# Patient Record
Sex: MALE | Race: WHITE | NOT HISPANIC OR LATINO | Employment: FULL TIME | URBAN - METROPOLITAN AREA
[De-identification: names, ages, dates, MRNs, and addresses within clinical notes are randomized per-mention and may not be internally consistent; named-entity substitution may affect disease eponyms.]

---

## 2019-02-25 ENCOUNTER — TELEPHONE (OUTPATIENT)
Dept: INTERNAL MEDICINE CLINIC | Facility: CLINIC | Age: 34
End: 2019-02-25

## 2019-02-25 NOTE — TELEPHONE ENCOUNTER
Xin Gutiérrez has moved away    Needs his chart faxed to his new PCP  Pt is going to have the new phys office fax over for his medical records today  Pt has appt this week   Can we send the ASAP

## 2023-12-04 ENCOUNTER — OFFICE VISIT (OUTPATIENT)
Age: 38
End: 2023-12-04
Payer: COMMERCIAL

## 2023-12-04 ENCOUNTER — APPOINTMENT (OUTPATIENT)
Age: 38
End: 2023-12-04
Payer: COMMERCIAL

## 2023-12-04 VITALS
HEIGHT: 67 IN | DIASTOLIC BLOOD PRESSURE: 46 MMHG | SYSTOLIC BLOOD PRESSURE: 136 MMHG | BODY MASS INDEX: 32.65 KG/M2 | WEIGHT: 208 LBS

## 2023-12-04 DIAGNOSIS — E10.9 TYPE 1 DIABETES MELLITUS WITHOUT COMPLICATION (HCC): ICD-10-CM

## 2023-12-04 DIAGNOSIS — E10.9 TYPE 1 DIABETES MELLITUS WITHOUT COMPLICATION (HCC): Primary | ICD-10-CM

## 2023-12-04 PROCEDURE — 83036 HEMOGLOBIN GLYCOSYLATED A1C: CPT

## 2023-12-04 PROCEDURE — 36415 COLL VENOUS BLD VENIPUNCTURE: CPT

## 2023-12-04 PROCEDURE — 99204 OFFICE O/P NEW MOD 45 MIN: CPT | Performed by: INTERNAL MEDICINE

## 2023-12-04 RX ORDER — INSULIN PUMP CARTRIDGE
CARTRIDGE (EA) SUBCUTANEOUS
COMMUNITY
Start: 2023-11-04

## 2023-12-04 RX ORDER — PROCHLORPERAZINE 25 MG/1
SUPPOSITORY RECTAL
COMMUNITY
Start: 2023-11-06

## 2023-12-04 RX ORDER — INSULIN LISPRO 100 [IU]/ML
50 INJECTION, SOLUTION INTRAVENOUS; SUBCUTANEOUS DAILY
COMMUNITY

## 2023-12-04 RX ORDER — KETOCONAZOLE 20 MG/G
CREAM TOPICAL
COMMUNITY

## 2023-12-04 RX ORDER — INFUSION SET FOR INSULIN PUMP
INFUSION SETS-PARAPHERNALIA MISCELLANEOUS
COMMUNITY
Start: 2023-11-20

## 2023-12-04 RX ORDER — PROCHLORPERAZINE 25 MG/1
SUPPOSITORY RECTAL
COMMUNITY
Start: 2023-11-04

## 2023-12-04 NOTE — PROGRESS NOTES
Fiordaliza Banks 44 y.o. male MRN: [id number]    Encounter: [id number]      Assessment/Plan     Assessment: This is a 44y.o.-year-old male with h/o T1DM since age 2 is referred for DM management  1-  DEXCOM date    BS average=160  in target 60% , 3+3 % low and very low and rest high  . Usually lowsare around 6 pm preceded by highs  12 am =0.5, carb/I=9/1, ISF=50/1  3 am=0.7       7/1 50/1  12:20 pm=0.25       10/1       50/1  6 pm=0.5                   9/1 50/1  Changes reservoir every 3 days  Updated on eye exam  Last A1c=6.4%(08/23)  RFL=160  No proteinuria    Plan:  New basal rate time at 3 pm with new basal rate of 0.35, drop 12 pm basal rate time. Then new time at 10 pm  with basal rate of 0.5  RTV in 4 m with A1c, TSH  Proceed with A1c today also    CC: Diabetes    History of Present Illness     HPI:  See assessment     Review of Systems   Constitutional:  Negative for appetite change, fatigue and unexpected weight change. HENT:  Negative for trouble swallowing. Eyes:  Negative for visual disturbance. Respiratory:  Negative for cough, chest tightness and shortness of breath. Cardiovascular:  Negative for chest pain, palpitations and leg swelling. Gastrointestinal:  Negative for blood in stool, constipation, diarrhea, nausea and vomiting. Endocrine: Negative for polyuria. Genitourinary:  Negative for dysuria, frequency and genital sores. Skin:  Negative for rash and wound. Neurological:  Negative for numbness and headaches. Hematological:  Does not bruise/bleed easily. Psychiatric/Behavioral:  Negative for confusion and sleep disturbance. Historical Information   No past medical history on file. No past surgical history on file.   Social History   Social History     Substance and Sexual Activity   Alcohol Use Not on file     Social History     Substance and Sexual Activity   Drug Use Not on file     Social History     Tobacco Use   Smoking Status Not on file Smokeless Tobacco Not on file     Family History: No family history on file. Meds/Allergies   Current Outpatient Medications   Medication Sig Dispense Refill    Continuous Blood Gluc Sensor (Dexcom G6 Sensor) MISC       Continuous Blood Gluc Transmit (Dexcom G6 Transmitter) MISC       HumaLOG 100 UNIT/ML injection 50 Units daily      Insulin Infusion Pump Supplies (AutoSoft 90 Infusion Set) MISC       Insulin Infusion Pump Supplies (T:slim X2 3mL Cartridge) MISC       ketoconazole (NIZORAL) 2 % cream        No current facility-administered medications for this visit. No Known Allergies    Objective   Vitals: Blood pressure (!) 136/46, height 5' 7" (1.702 m), weight 94.3 kg (208 lb). Physical Exam  Vitals reviewed. Constitutional:       Appearance: He is obese. HENT:      Head: Normocephalic. Eyes:      Extraocular Movements: Extraocular movements intact. Neck:      Vascular: No carotid bruit. Cardiovascular:      Rate and Rhythm: Normal rate and regular rhythm. Pulses: Normal pulses. Heart sounds: Normal heart sounds. No murmur heard. No gallop. Pulmonary:      Effort: Pulmonary effort is normal.      Breath sounds: Normal breath sounds. No wheezing or rales. Abdominal:      General: Bowel sounds are normal.      Palpations: Abdomen is soft. Tenderness: There is no abdominal tenderness. Musculoskeletal:      Cervical back: No tenderness. Right lower leg: No edema. Left lower leg: No edema. Lymphadenopathy:      Cervical: No cervical adenopathy. Skin:     General: Skin is warm. Coloration: Skin is not jaundiced. Findings: No rash. Neurological:      General: No focal deficit present. Mental Status: He is alert and oriented to person, place, and time. Cranial Nerves: No cranial nerve deficit.    Psychiatric:         Mood and Affect: Mood normal.         Behavior: Behavior normal.       The history was obtained from the review of the chart, patient. Lab Results:            Imaging Studies: I have personally reviewed pertinent reports. Portions of the record may have been created with voice recognition software. Occasional wrong word or "sound a like" substitutions may have occurred due to the inherent limitations of voice recognition software. Read the chart carefully and recognize, using context, where substitutions have occurred.

## 2023-12-05 LAB
EST. AVERAGE GLUCOSE BLD GHB EST-MCNC: 154 MG/DL
HBA1C MFR BLD: 7 %

## 2024-01-18 DIAGNOSIS — E10.9 TYPE 1 DIABETES MELLITUS WITHOUT COMPLICATION (HCC): Primary | ICD-10-CM

## 2024-01-18 NOTE — TELEPHONE ENCOUNTER
Requested medication(s) are due for refill today: Yes  Patient has already received a courtesy refill: No  Other reason request has been forwarded to provider: Failed protocol

## 2024-01-19 RX ORDER — INSULIN LISPRO 100 [IU]/ML
INJECTION, SOLUTION INTRAVENOUS; SUBCUTANEOUS
Qty: 50 ML | Refills: 1 | Status: SHIPPED | OUTPATIENT
Start: 2024-01-19

## 2024-01-31 DIAGNOSIS — E10.9 TYPE 1 DIABETES MELLITUS WITHOUT COMPLICATION (HCC): Primary | ICD-10-CM

## 2024-02-07 RX ORDER — INFUSION SET FOR INSULIN PUMP
INFUSION SETS-PARAPHERNALIA MISCELLANEOUS
Qty: 10 EACH | Refills: 2 | Status: SHIPPED | OUTPATIENT
Start: 2024-02-07

## 2024-02-07 RX ORDER — PROCHLORPERAZINE 25 MG/1
SUPPOSITORY RECTAL
Qty: 3 EACH | Refills: 2 | Status: SHIPPED | OUTPATIENT
Start: 2024-02-07

## 2024-02-07 RX ORDER — PROCHLORPERAZINE 25 MG/1
1 SUPPOSITORY RECTAL
Qty: 3 EACH | Refills: 2 | Status: SHIPPED | OUTPATIENT
Start: 2024-02-07

## 2024-02-07 RX ORDER — INSULIN PUMP CARTRIDGE
CARTRIDGE (EA) SUBCUTANEOUS
Qty: 10 EACH | Refills: 2 | Status: SHIPPED | OUTPATIENT
Start: 2024-02-07

## 2024-03-28 ENCOUNTER — TELEPHONE (OUTPATIENT)
Age: 39
End: 2024-03-28

## 2024-03-28 NOTE — TELEPHONE ENCOUNTER
Griselda from [employer] called looking for update in regards to document request that was faxed on 3/22.  ( Did not see this in the chart) Griselda also said they emailed you as well.     Griselda is asking for a call back. She states it looks like his insurance is also looking for a letter of medical necessity as well as the request.    Griselda is requesting a call back from you.    Griselda from [employer]  [phone]

## 2024-03-29 NOTE — TELEPHONE ENCOUNTER
Received message upon return to the office, called Griselda at [employer] at the provided number, however it was a general sales line and we where not able to leave a message. The forms have been printed, provider is out of the office today, however we will scan into chart and have provider sign upon return next week. Patient also comes in next week for an appt

## 2024-04-09 ENCOUNTER — LAB (OUTPATIENT)
Age: 39
End: 2024-04-09
Payer: COMMERCIAL

## 2024-04-09 ENCOUNTER — OFFICE VISIT (OUTPATIENT)
Age: 39
End: 2024-04-09
Payer: COMMERCIAL

## 2024-04-09 VITALS
SYSTOLIC BLOOD PRESSURE: 118 MMHG | BODY MASS INDEX: 33.12 KG/M2 | WEIGHT: 211 LBS | DIASTOLIC BLOOD PRESSURE: 78 MMHG | HEIGHT: 67 IN | HEART RATE: 57 BPM | OXYGEN SATURATION: 97 %

## 2024-04-09 DIAGNOSIS — E10.9 TYPE 1 DIABETES MELLITUS WITHOUT COMPLICATION (HCC): Primary | Chronic | ICD-10-CM

## 2024-04-09 DIAGNOSIS — E10.9 TYPE 1 DIABETES MELLITUS WITHOUT COMPLICATION (HCC): ICD-10-CM

## 2024-04-09 DIAGNOSIS — E10.9 TYPE 1 DIABETES MELLITUS WITHOUT COMPLICATION (HCC): Primary | ICD-10-CM

## 2024-04-09 DIAGNOSIS — E55.9 VITAMIN D DEFICIENCY: ICD-10-CM

## 2024-04-09 LAB
25(OH)D3 SERPL-MCNC: 20.6 NG/ML (ref 30–100)
ALBUMIN SERPL BCP-MCNC: 4.6 G/DL (ref 3.5–5)
ALP SERPL-CCNC: 47 U/L (ref 34–104)
ALT SERPL W P-5'-P-CCNC: 18 U/L (ref 7–52)
ANION GAP SERPL CALCULATED.3IONS-SCNC: 6 MMOL/L (ref 4–13)
AST SERPL W P-5'-P-CCNC: 21 U/L (ref 13–39)
BILIRUB SERPL-MCNC: 0.42 MG/DL (ref 0.2–1)
BUN SERPL-MCNC: 13 MG/DL (ref 5–25)
CALCIUM SERPL-MCNC: 9.1 MG/DL (ref 8.4–10.2)
CHLORIDE SERPL-SCNC: 103 MMOL/L (ref 96–108)
CHOLEST SERPL-MCNC: 166 MG/DL
CO2 SERPL-SCNC: 31 MMOL/L (ref 21–32)
CREAT SERPL-MCNC: 0.88 MG/DL (ref 0.6–1.3)
CREAT UR-MCNC: 99.6 MG/DL
GFR SERPL CREATININE-BSD FRML MDRD: 108 ML/MIN/1.73SQ M
GLUCOSE P FAST SERPL-MCNC: 188 MG/DL (ref 65–99)
HDLC SERPL-MCNC: 59 MG/DL
LDLC SERPL CALC-MCNC: 95 MG/DL (ref 0–100)
MICROALBUMIN UR-MCNC: 7.9 MG/L
MICROALBUMIN/CREAT 24H UR: 8 MG/G CREATININE (ref 0–30)
POTASSIUM SERPL-SCNC: 4.3 MMOL/L (ref 3.5–5.3)
PROT SERPL-MCNC: 6.6 G/DL (ref 6.4–8.4)
SL AMB POCT HEMOGLOBIN AIC: 6.5 (ref ?–6.5)
SODIUM SERPL-SCNC: 140 MMOL/L (ref 135–147)
T4 FREE SERPL-MCNC: 0.68 NG/DL (ref 0.61–1.12)
TRIGL SERPL-MCNC: 61 MG/DL
TSH SERPL DL<=0.05 MIU/L-ACNC: 1.29 UIU/ML (ref 0.45–4.5)

## 2024-04-09 PROCEDURE — 36415 COLL VENOUS BLD VENIPUNCTURE: CPT

## 2024-04-09 PROCEDURE — 95251 CONT GLUC MNTR ANALYSIS I&R: CPT | Performed by: STUDENT IN AN ORGANIZED HEALTH CARE EDUCATION/TRAINING PROGRAM

## 2024-04-09 PROCEDURE — 80061 LIPID PANEL: CPT

## 2024-04-09 PROCEDURE — 83036 HEMOGLOBIN GLYCOSYLATED A1C: CPT

## 2024-04-09 PROCEDURE — 84439 ASSAY OF FREE THYROXINE: CPT

## 2024-04-09 PROCEDURE — 99214 OFFICE O/P EST MOD 30 MIN: CPT | Performed by: STUDENT IN AN ORGANIZED HEALTH CARE EDUCATION/TRAINING PROGRAM

## 2024-04-09 PROCEDURE — 82306 VITAMIN D 25 HYDROXY: CPT

## 2024-04-09 PROCEDURE — 84443 ASSAY THYROID STIM HORMONE: CPT

## 2024-04-09 PROCEDURE — 82570 ASSAY OF URINE CREATININE: CPT

## 2024-04-09 PROCEDURE — 82043 UR ALBUMIN QUANTITATIVE: CPT

## 2024-04-09 PROCEDURE — 80053 COMPREHEN METABOLIC PANEL: CPT

## 2024-04-09 RX ORDER — INSULIN LISPRO 100 [IU]/ML
INJECTION, SOLUTION INTRAVENOUS; SUBCUTANEOUS
Qty: 64 ML | Refills: 1 | Status: SHIPPED | OUTPATIENT
Start: 2024-04-09

## 2024-04-09 NOTE — ASSESSMENT & PLAN NOTE
Lab Results   Component Value Date    HGBA1C 7.0 (H) 12/04/2023   Diabetes well-controlled A1c of 6.5% (was checked at his visit today.)  Although his CGM report for last 2 weeks showed worsening of glycemic control, he admits dietary indiscretion, he just moved, was not following carb consistent diet,  For now I would not change his current setting, balanced diet and regular daily exercise was encouraged,  He is upgrading his pump to [employer] Mobi,  Hypoglycemia symptoms and treatment were reviewed.  Return back in 3 months.  Complete labs as ordered.

## 2024-04-17 ENCOUNTER — TELEPHONE (OUTPATIENT)
Age: 39
End: 2024-04-17

## 2024-04-17 NOTE — TELEPHONE ENCOUNTER
Pt is asking for a letter of medical necessity, he has to buy an iphone in order to use his new pump. His  told him if he gets this letter of med necessity they will pay for that phone since he needs it for his pump.   Please call pt when/if can be done/

## 2024-04-19 ENCOUNTER — DOCUMENTATION (OUTPATIENT)
Age: 39
End: 2024-04-19

## 2024-04-22 ENCOUNTER — TELEPHONE (OUTPATIENT)
Dept: DIABETES SERVICES | Facility: CLINIC | Age: 39
End: 2024-04-22

## 2024-04-22 DIAGNOSIS — E10.9 TYPE 1 DIABETES MELLITUS WITHOUT COMPLICATION (HCC): Primary | ICD-10-CM

## 2024-05-19 ENCOUNTER — OFFICE VISIT (OUTPATIENT)
Age: 39
End: 2024-05-19
Payer: COMMERCIAL

## 2024-05-19 ENCOUNTER — APPOINTMENT (OUTPATIENT)
Age: 39
End: 2024-05-19
Payer: COMMERCIAL

## 2024-05-19 VITALS
TEMPERATURE: 96.3 F | RESPIRATION RATE: 18 BRPM | BODY MASS INDEX: 33.3 KG/M2 | DIASTOLIC BLOOD PRESSURE: 84 MMHG | HEART RATE: 59 BPM | SYSTOLIC BLOOD PRESSURE: 128 MMHG | WEIGHT: 212.6 LBS | OXYGEN SATURATION: 98 %

## 2024-05-19 DIAGNOSIS — S92.535A CLOSED NONDISPLACED FRACTURE OF DISTAL PHALANX OF LESSER TOE OF LEFT FOOT, INITIAL ENCOUNTER: Primary | ICD-10-CM

## 2024-05-19 DIAGNOSIS — M79.672 PAIN IN LEFT FOOT: ICD-10-CM

## 2024-05-19 PROCEDURE — S9083 URGENT CARE CENTER GLOBAL: HCPCS | Performed by: PHYSICIAN ASSISTANT

## 2024-05-19 PROCEDURE — 73630 X-RAY EXAM OF FOOT: CPT

## 2024-05-19 PROCEDURE — G0382 LEV 3 HOSP TYPE B ED VISIT: HCPCS | Performed by: PHYSICIAN ASSISTANT

## 2024-05-19 NOTE — PROGRESS NOTES
[facility]        NAME: Edward Brand is a 38 y.o. male  : 1985    MRN: [id number]  DATE: May 19, 2024  TIME: 10:19 AM    Assessment and Plan   Closed nondisplaced fracture of distal phalanx of lesser toe of left foot, initial encounter [S92.535A]  1. Closed nondisplaced fracture of distal phalanx of lesser toe of left foot, initial encounter  Ambulatory Referral to Podiatry    Post Op Shoe      2. Pain in left foot  CANCELED: XR foot 2 vw left          Patient has a minimally displaced intra-articular fracture of the distal phalanx of the second toe of the left foot.  The toes are jorge taped together to the third toe.  He was placed into a postop shoe.  He will be referred to podiatry.    The patient verbalized understanding of exam findings and treatment plan.   We engaged in the shared decision-making process and treatment options were   discussed at length with the patient.  All questions, concerns and  complaints were answered and addressed to the patient's satisfaction.    Patient Instructions   There are no Patient Instructions on file for this visit.    Follow up with PCP in 3-5 days.  Proceed to  ER if symptoms worsen.    If tests are performed, our office will contact you with results only if   changes need to made to the care plan discussed with you at the visit.   You can review your full results on [facility].     Chief Complaint     Chief Complaint   Patient presents with   • Toe Pain     Left second toe pain stubbing toe last night before bed.Pain 7/10 on pain scale when ambulating. Did not take anything         History of Present Illness       HPI  Patient reports yesterday he was walking and stubbed his toe on a bathtub.  He is concerned for fracture.  He was notes pain in the distal aspect of the second toe with walking better with rest.  Denies any numbness or tingling.    Review of Systems   Review of Systems  All other related systems reviewed and are negative  except as noted in HPI    Current Medications       Current Outpatient Medications:   •  Continuous Blood Gluc Sensor (Dexcom G6 Sensor) MISC, Inject 1 Device under the skin every 10 days, Disp: 3 each, Rfl: 2  •  Continuous Blood Gluc Transmit (Dexcom G6 Transmitter) MISC, Used As Directed., Disp: 3 each, Rfl: 2  •  HumaLOG 100 UNIT/ML injection, Inject up to 70 units daily via insulin pump., Disp: 64 mL, Rfl: 1  •  Insulin Infusion Pump Supplies (AutoSoft 90 Infusion Set) MISC, Use As Directed, Disp: 10 each, Rfl: 2  •  Insulin Infusion Pump Supplies (T:slim X2 3mL Cartridge) MISC, Use As Directed., Disp: 10 each, Rfl: 2  •  ketoconazole (NIZORAL) 2 % cream, , Disp: , Rfl:   •  vitamin E, tocopherol, 200 units capsule, Take 200 Units by mouth daily, Disp: , Rfl:     Current Allergies     Allergies as of 05/19/2024 - Reviewed 05/19/2024   Allergen Reaction Noted   • Cat hair extract Allergic Rhinitis 09/03/2014   • Dog epithelium Allergic Rhinitis 08/26/2011            The following portions of the patient's history were reviewed and updated as appropriate: allergies, current medications, past family history, past medical history, past social history, past surgical history and problem list.     Past Medical History:   Diagnosis Date   • Diabetes mellitus (HCC)        History reviewed. No pertinent surgical history.    History reviewed. No pertinent family history.      Medications have been verified.        Objective   /84   Pulse 59   Temp (!) 96.3 °F (35.7 °C)   Resp 18   Wt 96.4 kg (212 lb 9.6 oz)   SpO2 98%   BMI 33.30 kg/m²   No LMP for male patient.       Physical Exam     Physical Exam    Left Ankle Exam   Swelling: mild    Other   Erythema: absent  Scars: absent  Sensation: normal  Pulse: present    Comments:  Ecchymosis and tenderness over the distal phalanx dorsally of the left second toe            I have personally reviewed pertinent films in PACS and my interpretation is x-ray left foot  "demonstrates on lateral view admittedly displaced intra-articular fracture of the dorsal aspect of the distal phalanx of the second toe.    Procedures  No Procedures performed today        Note: Portions of this record may have been created with voice recognition software. Occasional wrong word or \"sound a like\" substitutions may have occurred due to the inherent limitations of voice recognition software. Please read the chart carefully and recognize, using context, where substitutions have occurred.*      "

## 2024-06-21 ENCOUNTER — TELEPHONE (OUTPATIENT)
Age: 39
End: 2024-06-21

## 2024-07-01 ENCOUNTER — OFFICE VISIT (OUTPATIENT)
Dept: ENDOCRINOLOGY | Facility: CLINIC | Age: 39
End: 2024-07-01
Payer: COMMERCIAL

## 2024-07-01 VITALS
HEART RATE: 64 BPM | BODY MASS INDEX: 32.51 KG/M2 | OXYGEN SATURATION: 98 % | TEMPERATURE: 98.7 F | WEIGHT: 207.6 LBS | SYSTOLIC BLOOD PRESSURE: 136 MMHG | DIASTOLIC BLOOD PRESSURE: 80 MMHG

## 2024-07-01 DIAGNOSIS — E10.9 TYPE 1 DIABETES MELLITUS WITHOUT COMPLICATION (HCC): Primary | ICD-10-CM

## 2024-07-01 DIAGNOSIS — E55.9 VITAMIN D DEFICIENCY: ICD-10-CM

## 2024-07-01 PROCEDURE — 95251 CONT GLUC MNTR ANALYSIS I&R: CPT | Performed by: STUDENT IN AN ORGANIZED HEALTH CARE EDUCATION/TRAINING PROGRAM

## 2024-07-01 PROCEDURE — 99214 OFFICE O/P EST MOD 30 MIN: CPT | Performed by: STUDENT IN AN ORGANIZED HEALTH CARE EDUCATION/TRAINING PROGRAM

## 2024-07-01 RX ORDER — OMEGA-3S/DHA/EPA/FISH OIL/D3 300MG-1000
400 CAPSULE ORAL DAILY
COMMUNITY

## 2024-07-01 NOTE — ASSESSMENT & PLAN NOTE
Lab Results   Component Value Date    HGBA1C 6.5 04/09/2024     Diabetes is controlled A1c of 6.5% and time in range of 78 %, however his CGM report showed frequent hypoglycemia 7.1%, mostly during the day and mostly postprandial, there are few issues, first, he inserts carb amount mostly either in the middle of eating or after his done eating, which is causing hyperglycemia and then hypoglycemia postprandially, he also over correcting his hypoglycemia.  In addition to asking him insert carb before he eats, I decreased his insulin carb ratio from 1: 7 to 1:9.  He was instructed to call our office in 2 weeks if still gets frequent hypoglycemia episode, next will be to decrease his basal ratio during the day.  Hypoglycemia symptoms were reviewed, rule of 15's to treat hypoglycemia emphasized.  Ophthalmology and podiatry follow-up.  Return back in 3 months.  Labs prior to next visit.

## 2024-07-01 NOTE — PROGRESS NOTES
Established patient Progress Note      Cc: diabetes    Referring Provider  No referring provider defined for this encounter.     History of Present Illness:   Edward Brand is a 38 y.o. male with a history of type 1 diabetes  who presented for follow up.        Denies recent illness or hospitalizations.    Patient is on a [employer] Mobi pump prescribed by Endocrinology.  He has been on this pump for 1 years.   He denies any malfunctioning of the pump.      Current Insulin pump settings:  Basal rate:  12 AM: 0.5  3 AM: 0.7  3 PM: 0.35  6 PM: 0.6  10 PM: 0.5  Insulin to carb ratio:  12 AM: 9  3 AM: 7  6 PM: 9  Insulin sensitivity factor: 50  BG target: 110 mg/dL  Type of insulin: Humalog  Active Insulin Time: 4      Discussed with patient in case of malfunctioning of the pump to use basal and bolus therapy as backup which is prescribed to the patient. Also notified patient to call clinic if any issues.       Edward Brand   Device used,dexcom G6  Home use       Indication   Type 1 Diabetes      More than 72 hours of data was reviewed. Report to be scanned to chart.     Date Range: June 18 - July 1st    Analysis of data:   Average Glucose: 130 mg/dl  Coefficient of Variation: 38%   SD : 49 mg/dl   Time in Target Range: 78%   Time Above Range: 14.4%   Time Below Range: 7.1%       Hypoglycemic episodes:   H/o of hypoglycemia causing hospitalization or Intervention such as glucagon injection  or ambulance call : no  Has awareness: yes       Opthamology:  overdue, will schedule   Podiatry: no    on ACE inhibitor or ARB: no  on statin: no    Thyroid disorders: no      Patient Active Problem List   Diagnosis    Type 1 diabetes mellitus without complication (HCC)    Vitamin D deficiency    Closed nondisplaced fracture of distal phalanx of lesser toe of left foot, initial encounter      Past Medical History:    Diagnosis Date    Diabetes mellitus (HCC)       History reviewed. No pertinent surgical history.   History reviewed. No pertinent family history.  Social History     Tobacco Use    Smoking status: Former     Current packs/day: 1.00     Average packs/day: 1 pack/day for 22.5 years (22.5 ttl pk-yrs)     Types: Cigarettes     Start date: 2002    Smokeless tobacco: Never   Substance Use Topics    Alcohol use: Never     Allergies   Allergen Reactions    Cat Hair Extract Allergic Rhinitis    Dog Epithelium Allergic Rhinitis         Current Outpatient Medications:     cholecalciferol (VITAMIN D3) 400 units tablet, Take 400 Units by mouth daily, Disp: , Rfl:     Continuous Blood Gluc Sensor (Dexcom G6 Sensor) MISC, Inject 1 Device under the skin every 10 days, Disp: 3 each, Rfl: 2    Continuous Blood Gluc Transmit (Dexcom G6 Transmitter) MISC, Used As Directed., Disp: 3 each, Rfl: 2    HumaLOG 100 UNIT/ML injection, Inject up to 70 units daily via insulin pump., Disp: 64 mL, Rfl: 1    Insulin Infusion Pump Supplies (AutoSoft 90 Infusion Set) MISC, Use As Directed, Disp: 10 each, Rfl: 2    Insulin Infusion Pump Supplies (T:slim X2 3mL Cartridge) MISC, Use As Directed., Disp: 10 each, Rfl: 2    ketoconazole (NIZORAL) 2 % cream, , Disp: , Rfl:     vitamin E, tocopherol, 200 units capsule, Take 200 Units by mouth daily (Patient not taking: Reported on 7/1/2024), Disp: , Rfl:   Review of Systems   Constitutional:  Negative for appetite change, fatigue and unexpected weight change.   HENT:  Negative for trouble swallowing.    Eyes:  Negative for visual disturbance.   Cardiovascular:  Negative for chest pain and palpitations.   Gastrointestinal:  Negative for abdominal pain, constipation, diarrhea, nausea and vomiting.   Endocrine: Negative for polydipsia, polyphagia and polyuria.       Physical Exam:  Body mass index is 32.51 kg/m².  /80 (BP Location: Right arm, Patient Position: Sitting, Cuff Size: Adult)   Pulse 64    "Temp 98.7 °F (37.1 °C)   Wt 94.2 kg (207 lb 9.6 oz)   SpO2 98%   BMI 32.51 kg/m²    Wt Readings from Last 3 Encounters:   07/01/24 94.2 kg (207 lb 9.6 oz)   05/19/24 96.4 kg (212 lb 9.6 oz)   04/09/24 95.7 kg (211 lb)       GEN: NAD  E/n/m nl facies,   Eyes: no stare or proptosis,   CV; heart reg rate s1s2 nl, no Murmur   Neuro: no tremor,   Skin: warm and dry,   Ext:  no edema bilaterally,   Psych: nl mood and affect, no gross lapses in memory      Labs:   No components found for: \"HA1C\"  No components found for: \"GLU\"    Lab Results   Component Value Date    CREATININE 0.88 04/09/2024    BUN 13 04/09/2024    K 4.3 04/09/2024     04/09/2024    CO2 31 04/09/2024     eGFR   Date Value Ref Range Status   04/09/2024 108 ml/min/1.73sq m Final     No components found for: \"MALBCRER\"    Lab Results   Component Value Date    HDL 59 04/09/2024    TRIG 61 04/09/2024       Lab Results   Component Value Date    ALT 18 04/09/2024    AST 21 04/09/2024    ALKPHOS 47 04/09/2024       Lab Results   Component Value Date    FREET4 0.68 04/09/2024       Impression:  1. Type 1 diabetes mellitus without complication (HCC)    2. Vitamin D deficiency           Plan:    Problem List Items Addressed This Visit          Endocrine    Type 1 diabetes mellitus without complication (HCC) - Primary       Lab Results   Component Value Date    HGBA1C 6.5 04/09/2024     Diabetes is controlled A1c of 6.5% and time in range of 78 %, however his CGM report showed frequent hypoglycemia 7.1%, mostly during the day and mostly postprandial, there are few issues, first, he inserts carb amount mostly either in the middle of eating or after his done eating, which is causing hyperglycemia and then hypoglycemia postprandially, he also over correcting his hypoglycemia.  In addition to asking him insert carb before he eats, I decreased his insulin carb ratio from 1: 7 to 1:9.  He was instructed to call our office in 2 weeks if still gets frequent " hypoglycemia episode, next will be to decrease his basal ratio during the day.  Hypoglycemia symptoms were reviewed, rule of 15's to treat hypoglycemia emphasized.  Ophthalmology and podiatry follow-up.  Return back in 3 months.  Labs prior to next visit.           Relevant Orders    Hemoglobin A1C    Comprehensive metabolic panel    Lipid Panel with Direct LDL reflex    Albumin / creatinine urine ratio    Cortisol Level, AM Specimen       Other    Vitamin D deficiency     Continue vitamin D supplementation.                  Discussed with the patient and all questioned fully answered. He will call me if any problems arise.    Counseled patient on diagnostic results, prognosis, risk and benefit of treatment options, instruction for management, importance of treatment compliance, Risk  factor reduction and impressions      Brice Kang MD

## 2024-08-09 DIAGNOSIS — E10.9 TYPE 1 DIABETES MELLITUS WITHOUT COMPLICATION (HCC): ICD-10-CM

## 2024-08-09 RX ORDER — ACYCLOVIR 400 MG/1
1 TABLET ORAL
COMMUNITY
End: 2024-08-09 | Stop reason: SDUPTHER

## 2024-08-09 RX ORDER — ACYCLOVIR 400 MG/1
TABLET ORAL
COMMUNITY
End: 2024-08-09 | Stop reason: SDUPTHER

## 2024-08-09 RX ORDER — PROCHLORPERAZINE 25 MG/1
1 SUPPOSITORY RECTAL
Qty: 3 EACH | Refills: 2 | Status: SHIPPED | OUTPATIENT
Start: 2024-08-09

## 2024-08-09 NOTE — TELEPHONE ENCOUNTER
Reason for call:   [x] Refill   [] Prior Auth  [] Other:     Office:   [] PCP/Provider -   [x] Specialty/Provider - Jorge Luis-jason    Medication: Dexcom G6    Dose/Frequency: 1 unit every 10 days    Quantity: 3    Pharmacy: [id number] - [place] - [address] [phone]     Does the patient have enough for 3 days?   [] Yes   [x] No - Send as HP to POD

## 2024-10-12 ENCOUNTER — APPOINTMENT (OUTPATIENT)
Age: 39
End: 2024-10-12
Payer: COMMERCIAL

## 2024-10-12 DIAGNOSIS — E10.9 TYPE 1 DIABETES MELLITUS WITHOUT COMPLICATION (HCC): ICD-10-CM

## 2024-10-12 LAB
ALBUMIN SERPL BCG-MCNC: 4.6 G/DL (ref 3.5–5)
ALP SERPL-CCNC: 58 U/L (ref 34–104)
ALT SERPL W P-5'-P-CCNC: 12 U/L (ref 7–52)
ANION GAP SERPL CALCULATED.3IONS-SCNC: 9 MMOL/L (ref 4–13)
AST SERPL W P-5'-P-CCNC: 18 U/L (ref 13–39)
BILIRUB SERPL-MCNC: 0.63 MG/DL (ref 0.2–1)
BUN SERPL-MCNC: 11 MG/DL (ref 5–25)
CALCIUM SERPL-MCNC: 9.3 MG/DL (ref 8.4–10.2)
CHLORIDE SERPL-SCNC: 102 MMOL/L (ref 96–108)
CHOLEST SERPL-MCNC: 162 MG/DL
CO2 SERPL-SCNC: 29 MMOL/L (ref 21–32)
CREAT SERPL-MCNC: 0.92 MG/DL (ref 0.6–1.3)
CREAT UR-MCNC: 66.7 MG/DL
EST. AVERAGE GLUCOSE BLD GHB EST-MCNC: 148 MG/DL
GFR SERPL CREATININE-BSD FRML MDRD: 104 ML/MIN/1.73SQ M
GLUCOSE P FAST SERPL-MCNC: 138 MG/DL (ref 65–99)
HBA1C MFR BLD: 6.8 %
HDLC SERPL-MCNC: 49 MG/DL
LDLC SERPL CALC-MCNC: 100 MG/DL (ref 0–100)
MICROALBUMIN UR-MCNC: <7 MG/L
POTASSIUM SERPL-SCNC: 4.4 MMOL/L (ref 3.5–5.3)
PROT SERPL-MCNC: 6.6 G/DL (ref 6.4–8.4)
SODIUM SERPL-SCNC: 140 MMOL/L (ref 135–147)
TRIGL SERPL-MCNC: 67 MG/DL

## 2024-10-12 PROCEDURE — 80061 LIPID PANEL: CPT

## 2024-10-12 PROCEDURE — 80053 COMPREHEN METABOLIC PANEL: CPT

## 2024-10-12 PROCEDURE — 82570 ASSAY OF URINE CREATININE: CPT

## 2024-10-12 PROCEDURE — 83036 HEMOGLOBIN GLYCOSYLATED A1C: CPT

## 2024-10-12 PROCEDURE — 36415 COLL VENOUS BLD VENIPUNCTURE: CPT

## 2024-10-12 PROCEDURE — 82043 UR ALBUMIN QUANTITATIVE: CPT

## 2024-10-24 DIAGNOSIS — E10.9 TYPE 1 DIABETES MELLITUS WITHOUT COMPLICATION (HCC): Chronic | ICD-10-CM

## 2024-10-24 RX ORDER — INSULIN LISPRO 100 [IU]/ML
INJECTION, SOLUTION INTRAVENOUS; SUBCUTANEOUS
Qty: 60 ML | Refills: 1 | Status: SHIPPED | OUTPATIENT
Start: 2024-10-24

## 2024-11-08 ENCOUNTER — OFFICE VISIT (OUTPATIENT)
Dept: ENDOCRINOLOGY | Facility: CLINIC | Age: 39
End: 2024-11-08
Payer: COMMERCIAL

## 2024-11-08 VITALS
OXYGEN SATURATION: 98 % | SYSTOLIC BLOOD PRESSURE: 130 MMHG | BODY MASS INDEX: 31.11 KG/M2 | TEMPERATURE: 97.8 F | HEART RATE: 68 BPM | DIASTOLIC BLOOD PRESSURE: 70 MMHG | WEIGHT: 198.2 LBS | HEIGHT: 67 IN

## 2024-11-08 DIAGNOSIS — E55.9 VITAMIN D DEFICIENCY: ICD-10-CM

## 2024-11-08 DIAGNOSIS — E10.9 TYPE 1 DIABETES MELLITUS WITHOUT COMPLICATION (HCC): Primary | ICD-10-CM

## 2024-11-08 PROCEDURE — 95251 CONT GLUC MNTR ANALYSIS I&R: CPT | Performed by: STUDENT IN AN ORGANIZED HEALTH CARE EDUCATION/TRAINING PROGRAM

## 2024-11-08 PROCEDURE — 99214 OFFICE O/P EST MOD 30 MIN: CPT | Performed by: STUDENT IN AN ORGANIZED HEALTH CARE EDUCATION/TRAINING PROGRAM

## 2024-11-08 NOTE — PROGRESS NOTES
Ambulatory Visit  Name: Edward Brand      : 1985      MRN: [id number]  Encounter Provider: Brice Kang MD  Encounter Date: 2024   Encounter department: [facility] [place]    Assessment & Plan  Type 1 diabetes mellitus without complication (HCC)    Lab Results   Component Value Date    HGBA1C 6.8 (H) 10/12/2024     Diabetes is controlled, with A1C of 6.8%, we did reviewed his pump report as well as CGM report, for the last 2 weeks blood sugars have been high variable, dietary indiscretion and not inserting right amount of carb at the right time are contributing factors,  Healthier dietary choices was advised,  He was advised to insert carbs before he eats, not override to avoid insulin stacking,   Rule of 15s in treatment of hypoglycemia reviewed to avoid over correction of hypoglycemia,  Ophthalmology and podiatry follow up.  Return back in 3 months   Lab prior to next visit.         Orders:    Hemoglobin A1C; Future    Comprehensive metabolic panel; Future    Vitamin D deficiency  Continue vitamin D3 supplementation,              History of Present Illness     Edward Brand is a 38 y.o. male who presents for follow-up for type 1 diabetes.        Patient is on a Tandem Mobi pump prescribed by Endocrinology.  He has been on this pump for 1 years.   He denies any malfunctioning of the pump.       Current Insulin pump settings:  Basal rate:  12 AM: 0.5  3 AM: 0.7  3 PM: 0.35  6 PM: 0.6  10 PM: 0.5  Insulin to carb ratio:  12 AM: 9  3 AM: 7  6 PM: 9  Insulin sensitivity factor: 50  BG target: 110 mg/dL  Type of insulin: Humalog  Active Insulin Time: 4        Edward Brand   Device used, guardian 4  Home use       Indication   Type 1 Diabetes      More than 72 hours of data was reviewed. Report to be scanned to chart.     Date Range: -    Analysis of data:   Average Glucose: 157 mg/dL  Coefficient of Variation: x   SD : 71 mg/dL  Time in  "Target Range: 67%  Time Above Range: 28%  Time Below Range: 5.4%    Eye exam: overdue,   Foot exam: UTD    History obtained from : patient  Review of Systems   Constitutional:  Negative for appetite change, fatigue and unexpected weight change.   HENT:  Negative for trouble swallowing.    Eyes:  Negative for visual disturbance.   Cardiovascular:  Negative for chest pain and palpitations.   Gastrointestinal:  Negative for abdominal pain, constipation, diarrhea, nausea and vomiting.   Endocrine: Negative for polydipsia, polyphagia and polyuria.     Medical History Reviewed by provider this encounter:  Meds       Current Outpatient Medications on File Prior to Visit   Medication Sig Dispense Refill    Continuous Blood Gluc Transmit (Dexcom G6 Transmitter) MISC Used As Directed. 3 each 2    Continuous Glucose  (Dexcom G7 ) BART Use 1 each in the morning 1 each 0    Continuous Glucose Sensor (Dexcom G6 Sensor) MISC Inject 1 Device under the skin every 10 days 3 each 2    Continuous Glucose Sensor (Dexcom G7 Sensor) Use 1 Device every 10 days 12 each 2    HumaLOG 100 UNIT/ML injection INJECT UP TO 70 UNITS DAILY VIA INSULIN PUMP 60 mL 1    Insulin Infusion Pump Supplies (AutoSoft 90 Infusion Set) MISC Use As Directed 10 each 2    Insulin Infusion Pump Supplies (T:slim X2 3mL Cartridge) MISC Use As Directed. 10 each 2    ketoconazole (NIZORAL) 2 % cream       cholecalciferol (VITAMIN D3) 400 units tablet Take 400 Units by mouth daily (Patient not taking: Reported on 11/8/2024)      vitamin E, tocopherol, 200 units capsule Take 200 Units by mouth daily (Patient not taking: Reported on 7/1/2024)       No current facility-administered medications on file prior to visit.          Objective     /70 (BP Location: Right arm, Patient Position: Sitting, Cuff Size: Adult)   Pulse 68   Temp 97.8 °F (36.6 °C) (Tympanic)   Ht 5' 7\" (1.702 m)   Wt 89.9 kg (198 lb 3.2 oz)   SpO2 98%   BMI 31.04 kg/m² "     Physical Exam  Vitals and nursing note reviewed.   Constitutional:       General: He is not in acute distress.     Appearance: He is well-developed.   HENT:      Head: Normocephalic and atraumatic.   Eyes:      Conjunctiva/sclera: Conjunctivae normal.   Cardiovascular:      Rate and Rhythm: Normal rate and regular rhythm.      Heart sounds: No murmur heard.  Pulmonary:      Effort: Pulmonary effort is normal. No respiratory distress.      Breath sounds: Normal breath sounds.   Musculoskeletal:         General: No swelling.      Cervical back: Neck supple.   Skin:     General: Skin is warm and dry.   Neurological:      Mental Status: He is alert.   Psychiatric:         Mood and Affect: Mood normal.       Administrative Statements   I have spent a total time of 35 minutes in caring for this patient on the day of the visit/encounter including Diagnostic results, Prognosis, Risks and benefits of tx options, Instructions for management, Patient and family education, Importance of tx compliance, Risk factor reductions, Impressions, Counseling / Coordination of care, Documenting in the medical record, Reviewing / ordering tests, medicine, procedures  , and Obtaining or reviewing history  .    Component      Latest Ref Rng 10/12/2024   Sodium      135 - 147 mmol/L 140    Potassium      3.5 - 5.3 mmol/L 4.4    Chloride      96 - 108 mmol/L 102    Carbon Dioxide      21 - 32 mmol/L 29    ANION GAP      4 - 13 mmol/L 9    BUN      5 - 25 mg/dL 11    Creatinine      0.60 - 1.30 mg/dL 0.92    GLUCOSE, FASTING      65 - 99 mg/dL 138 (H)    Calcium      8.4 - 10.2 mg/dL 9.3    AST      13 - 39 U/L 18    ALT      7 - 52 U/L 12    ALK PHOS      34 - 104 U/L 58    Total Protein      6.4 - 8.4 g/dL 6.6    Albumin      3.5 - 5.0 g/dL 4.6    Total Bilirubin      0.20 - 1.00 mg/dL 0.63    GFR, Calculated      ml/min/1.73sq m 104    Cholesterol      See Comment mg/dL 162    Triglycerides      See Comment mg/dL 67    HDL      >=40  mg/dL 49    LDL Calculated      0 - 100 mg/dL 100    EXT Creatinine Urine      Reference range not established. mg/dL 66.7    Albumin,U,Random      <20.0 mg/L <7.0    Albumin Creat Ratio --    Hemoglobin A1C      Normal 4.0-5.6%; PreDiabetic 5.7-6.4%; Diabetic >=6.5%; Glycemic control for adults with diabetes <7.0% % 6.8 (H)    eAG, EST AVG Glucose      mg/dl 148       Legend:  (H) High

## 2024-11-11 NOTE — ASSESSMENT & PLAN NOTE
Lab Results   Component Value Date    HGBA1C 6.8 (H) 10/12/2024     Diabetes is controlled, with A1C of 6.8%, we did reviewed his pump report as well as CGM report, for the last 2 weeks blood sugars have been high variable, dietary indiscretion and not inserting right amount of carb at the right time are contributing factors,  Healthier dietary choices was advised,  He was advised to insert carbs before he eats, not override to avoid insulin stacking,   Rule of 15s in treatment of hypoglycemia reviewed to avoid over correction of hypoglycemia,  Ophthalmology and podiatry follow up.  Return back in 3 months   Lab prior to next visit.         Orders:    Hemoglobin A1C; Future    Comprehensive metabolic panel; Future

## 2025-02-18 ENCOUNTER — TELEPHONE (OUTPATIENT)
Age: 40
End: 2025-02-18

## 2025-02-18 NOTE — TELEPHONE ENCOUNTER
Received request from [facility]    Alternative to Humalog 100unit insulin requested     Suggested alternatives: Novolog 100unit flexpen, novolog 100unit vial, novolog penfill 100unit, relion novolog u-100 flexpen, relion novolog 100unit/ml vl

## 2025-02-19 DIAGNOSIS — E10.9 TYPE 1 DIABETES MELLITUS WITHOUT COMPLICATION (HCC): Primary | ICD-10-CM

## 2025-02-19 RX ORDER — HUMAN INSULIN 100 [IU]/ML
INJECTION, SUSPENSION SUBCUTANEOUS
Qty: 70 ML | Refills: 1 | Status: SHIPPED | OUTPATIENT
Start: 2025-02-19

## 2025-02-25 ENCOUNTER — TELEPHONE (OUTPATIENT)
Age: 40
End: 2025-02-25

## 2025-02-25 NOTE — TELEPHONE ENCOUNTER
Phone call from patient with Insulin Pump, thought he had appt, but did not have anything scheduled. Appt scheduled for May, and patient has been added to wait list. Please review chart to see if okay for patient to wait until May. If patient is okay to wait until May - No Further Action Required.

## 2025-02-26 ENCOUNTER — TELEPHONE (OUTPATIENT)
Age: 40
End: 2025-02-26

## 2025-02-26 NOTE — TELEPHONE ENCOUNTER
[facility] called to report a problem with filling the dexcom g7 sensors reporting its on back order. Called pt to report information and see if he would like to call his local [facility] to see if they have any in stock. If so, pt will need a prescription sent to his local [facility]. Pt reported that he will call his local [facility] but also reports that he still has dexcom g6 receivers and if [facility] has that in stock , he will just need a prescription for the g6 sensors to be sent to [facility]. Pt will follow up .

## 2025-02-27 DIAGNOSIS — E10.9 TYPE 1 DIABETES MELLITUS WITHOUT COMPLICATION (HCC): Primary | ICD-10-CM

## 2025-02-27 RX ORDER — INSULIN ASPART 100 [IU]/ML
INJECTION, SOLUTION INTRAVENOUS; SUBCUTANEOUS
Qty: 70 ML | Refills: 1 | Status: SHIPPED | OUTPATIENT
Start: 2025-02-27

## 2025-02-27 NOTE — TELEPHONE ENCOUNTER
Patient called the rx line stating that he picked up the prescription for  (NovoLIN N) 100 Units/mL subcutaneous injection and realized that's not the correct one.   As per Patient it needs to Novolog and not Novolin. Patient is requesting for a new script to be send to [facility] pharmacy.  Please review and reach out to Patient for further assistance.

## 2025-03-17 DIAGNOSIS — E10.9 TYPE 1 DIABETES MELLITUS WITHOUT COMPLICATION (HCC): ICD-10-CM

## 2025-03-17 RX ORDER — ACYCLOVIR 400 MG/1
1 TABLET ORAL
Qty: 9 EACH | Refills: 1 | Status: SHIPPED | OUTPATIENT
Start: 2025-03-17

## 2025-03-17 RX ORDER — ACYCLOVIR 400 MG/1
1 TABLET ORAL
Qty: 1 EACH | Refills: 0 | Status: SHIPPED | OUTPATIENT
Start: 2025-03-17

## 2025-03-17 NOTE — TELEPHONE ENCOUNTER
Reason for call:   [x] Refill   [] Prior Auth  [x] Other: Patient requesting short term supply to [facility]    Office:   [] PCP/Provider -   [x] Specialty/Provider - [facility] - Lizzette Melendez MD     Medication: Continuous Glucose Sensor (Dexcom G7 Sensor)     Dose/Frequency:  Use 1 Device every 10 days     Quantity: 12 each    Pharmacy: [facility]  [address] [facility] [phone]    ___________________________________________________      Medication: Continuous Glucose Sensor (Dexcom G7 Sensor)     Dose/Frequency:  Use 1 Device every 10 days     Quantity: 1 each    Pharmacy: [facility] [phone]    Local Pharmacy   Does the patient have enough for 3 days?   [] Yes   [x] No - Send as HP to POD    Mail Away Pharmacy   Does the patient have enough for 10 days?   [] Yes   [x] No - Send as HP to POD

## 2025-03-18 ENCOUNTER — RESULTS FOLLOW-UP (OUTPATIENT)
Dept: ENDOCRINOLOGY | Facility: CLINIC | Age: 40
End: 2025-03-18

## 2025-03-18 ENCOUNTER — APPOINTMENT (OUTPATIENT)
Age: 40
End: 2025-03-18
Payer: COMMERCIAL

## 2025-03-18 DIAGNOSIS — E10.9 TYPE 1 DIABETES MELLITUS WITHOUT COMPLICATION (HCC): ICD-10-CM

## 2025-03-18 LAB
ALBUMIN SERPL BCG-MCNC: 4.5 G/DL (ref 3.5–5)
ALP SERPL-CCNC: 58 U/L (ref 34–104)
ALT SERPL W P-5'-P-CCNC: 13 U/L (ref 7–52)
ANION GAP SERPL CALCULATED.3IONS-SCNC: 11 MMOL/L (ref 4–13)
AST SERPL W P-5'-P-CCNC: 19 U/L (ref 13–39)
BILIRUB SERPL-MCNC: 0.5 MG/DL (ref 0.2–1)
BUN SERPL-MCNC: 14 MG/DL (ref 5–25)
CALCIUM SERPL-MCNC: 9.1 MG/DL (ref 8.4–10.2)
CHLORIDE SERPL-SCNC: 102 MMOL/L (ref 96–108)
CO2 SERPL-SCNC: 27 MMOL/L (ref 21–32)
CREAT SERPL-MCNC: 0.89 MG/DL (ref 0.6–1.3)
EST. AVERAGE GLUCOSE BLD GHB EST-MCNC: 151 MG/DL
GFR SERPL CREATININE-BSD FRML MDRD: 106 ML/MIN/1.73SQ M
GLUCOSE P FAST SERPL-MCNC: 114 MG/DL (ref 65–99)
HBA1C MFR BLD: 6.9 %
POTASSIUM SERPL-SCNC: 4 MMOL/L (ref 3.5–5.3)
PROT SERPL-MCNC: 6.5 G/DL (ref 6.4–8.4)
SODIUM SERPL-SCNC: 140 MMOL/L (ref 135–147)

## 2025-03-18 PROCEDURE — 80053 COMPREHEN METABOLIC PANEL: CPT

## 2025-03-18 PROCEDURE — 36415 COLL VENOUS BLD VENIPUNCTURE: CPT

## 2025-03-18 PROCEDURE — 83036 HEMOGLOBIN GLYCOSYLATED A1C: CPT

## 2025-03-21 DIAGNOSIS — E10.9 TYPE 1 DIABETES MELLITUS WITHOUT COMPLICATION (HCC): ICD-10-CM

## 2025-03-21 RX ORDER — INSULIN INFUSION SET/CARTRIDGE
COMBINATION PACKAGE (EA) MISCELLANEOUS
Qty: 90 EACH | Refills: 1 | Status: SHIPPED | OUTPATIENT
Start: 2025-03-21

## 2025-04-10 ENCOUNTER — OFFICE VISIT (OUTPATIENT)
Age: 40
End: 2025-04-10
Payer: COMMERCIAL

## 2025-04-10 VITALS
HEIGHT: 67 IN | DIASTOLIC BLOOD PRESSURE: 80 MMHG | TEMPERATURE: 97.6 F | HEART RATE: 58 BPM | WEIGHT: 213.2 LBS | OXYGEN SATURATION: 97 % | BODY MASS INDEX: 33.46 KG/M2 | SYSTOLIC BLOOD PRESSURE: 130 MMHG

## 2025-04-10 DIAGNOSIS — E78.5 DYSLIPIDEMIA, GOAL LDL BELOW 70: ICD-10-CM

## 2025-04-10 DIAGNOSIS — E10.9 TYPE 1 DIABETES MELLITUS WITHOUT COMPLICATION (HCC): Primary | ICD-10-CM

## 2025-04-10 PROCEDURE — 95251 CONT GLUC MNTR ANALYSIS I&R: CPT

## 2025-04-10 PROCEDURE — 99214 OFFICE O/P EST MOD 30 MIN: CPT

## 2025-04-10 RX ORDER — GLUCAGON INJECTION, SOLUTION 1 MG/.2ML
0.2 INJECTION, SOLUTION SUBCUTANEOUS AS NEEDED
Qty: 0.2 ML | Refills: 1 | Status: SHIPPED | OUTPATIENT
Start: 2025-04-10

## 2025-04-10 RX ORDER — SYRINGE-NEEDLE,INSULIN,0.5 ML 27GX1/2"
SYRINGE, EMPTY DISPOSABLE MISCELLANEOUS
Qty: 100 EACH | Refills: 3 | Status: SHIPPED | OUTPATIENT
Start: 2025-04-10

## 2025-04-10 RX ORDER — ATORVASTATIN CALCIUM 10 MG/1
10 TABLET, FILM COATED ORAL DAILY
Qty: 90 TABLET | Refills: 1 | Status: SHIPPED | OUTPATIENT
Start: 2025-04-10

## 2025-04-10 RX ORDER — INSULIN GLARGINE 300 U/ML
INJECTION, SOLUTION SUBCUTANEOUS
Qty: 1.5 ML | Refills: 1 | Status: SHIPPED | OUTPATIENT
Start: 2025-04-10

## 2025-04-10 RX ORDER — PEN NEEDLE, DIABETIC 32GX 5/32"
NEEDLE, DISPOSABLE MISCELLANEOUS
Qty: 100 EACH | Refills: 1 | Status: SHIPPED | OUTPATIENT
Start: 2025-04-10

## 2025-04-10 NOTE — PROGRESS NOTES
Name: Edward Brand      : 1985      MRN: [id number]  Encounter Provider: SHARON Pearce  Encounter Date: 4/10/2025   Encounter department: [facility] [place]  :  Assessment & Plan  Type 1 diabetes mellitus without complication (HCC)  A1c at goal of less than 7%.    We tightened his carb ratio to 1: 7.5 and ISF to 1: 45.  He was encouraged to not override recommendations from the pump.  He was encouraged to avoid insulin stacking.  He was advised to come out of sleep mode and stay in control IQ so that he may receive the benefit of correctional boluses from the pump.    He has backup insulin in the event of pump failure.    Reviewed risks as well as signs and symptoms of hypoglycemia.  Rule of 15's provided in AVS for appropriate treatment.  Call the office for blood glucose levels less than 70 mg/dL or persistent patterns over 250 mg/dL.  Continue Dexcom G7 continuous glucose monitor.  Glucagon sent to his pharmacy in the event of an emergency.    Continue to improve diet and lifestyle including attention to the amount and type of carbohydrates consumed as well as increased physical activity as tolerated.  Encouraged to reduce carbohydrate intake.  Stay well-hydrated.    Follow-up in 4 months.  Labs prior to next visit.  Lab Results   Component Value Date    HGBA1C 6.9 (H) 2025       Orders:  •  Glucagon (Gvoke HypoPen 1-Pack) 1 MG/0.2ML SOAJ; Inject 0.2 mL under the skin if needed (Used to correct low blood sugar when unable to eat or drink) Use to correct low blood sugar when unable to eat or drink  •  insulin glargine (Toujeo SoloStar) 300 units/mL CONCENTRATED U-300 injection pen (1-unit dial); For use in the event of pump failure. Inject 20 units daily  •  Hemoglobin A1C; Future  •  Basic metabolic panel; Future  •  Ambulatory Referral to Family Practice  •  Insulin Pen Needle (BD Pen Needle Jaye U/F) 32G X 4 MM MISC; Use to administer  "insulin in the event of pump failure  •  Insulin Syringe-Needle U-100 30G X 1/2\" 1 ML MISC; Use to administer insulin 4x daily in the event of pump failure.  •  TSH, 3rd generation with Free T4 reflex; Future    Dyslipidemia, goal LDL below 70  LDL above goal of less than 70.  Will start statin therapy, atorvastatin 10 mg daily.  Reviewed common adverse effect of statin therapy includes myalgias and he should inform the office if he experiences same.  Orders:  •  Ambulatory Referral to Family Practice  •  atorvastatin (LIPITOR) 10 mg tablet; Take 1 tablet (10 mg total) by mouth daily        History of Present Illness   HPI  Edward Brand is a 39 y.o. male who presents to the office today for routine follow-up of type 1 diabetes, diagnosed at age 3. Diabetes course has been stable. Complications of diabetes include: denies.  He was last seen in the office 11/8/2024 by Dr. Kang at which time his A1c was 6.8% and he was advised to avoid insulin stacking and overriding bolus recommendations.  His most recent A1c is 6.9%.    Frequent recent episodes of hypoglycemia.  Symptoms of hypoglycemia include: \"feels off\", hungry, brain fog    History of Pancreatitis: No  Medic Alert Tag: Recommended ; not wearing    Patient is on a Insulin Pump Type: Tandem Mobi  pump since 2023  Current Problems with Pump: Denies    Current Insulin pump settings:  See Scanned Pump Downloads.  Basal Rate:   12 AM: 0.5  3 AM: 0.7  3 PM: 0.7  6 PM: 0.6  10 PM: 0.5  Insulin to carb ratio:  12 AM: 9  3 AM: 7  6 PM: 9  Insulin sensitivity factor: 50  BG target: 110  Type of insulin: Humalog  Active Insulin Time: 4 hours    Use of Glucagon: Never    Diet: Needs improvement     Discussed with patient in case of malfunctioning of the pump to use basal and bolus therapy as backup which is prescribed to the patient. Also notified patient to call clinic if any issues.     CGM REVIEW:  Device used : Dexcom G7, Home use   Indication: Type 1 Diabetes  Date " Range: 3/28/2024 - 4/10/2025  More than 72 hours of data was reviewed. Report to be scanned to chart.     Analysis of data:   Average Glucose: 149 mg/dL  Coefficient of Variation: 36%  SD : 54 mg/dL  Time in Target Range: 72%   Time Above Range: 18% high ; 5.5 % very high   Time Below Range: 2.5% low ;  1.3% very low     Interpretation of data: Blood glucose levels suboptimally controlled.  Frequent episodes of hypoglycemia, both spot and sustained, overnight and during the day.  Occasional episodes of severe and sustained postprandial hyperglycemia.  Wide variability noted.      History obtained from: patient    Review of Systems   Constitutional:  Negative for appetite change, fatigue and unexpected weight change.   HENT:  Negative for congestion, hearing loss and sore throat.    Respiratory:  Negative for cough.    Cardiovascular:  Negative for chest pain and palpitations.   Gastrointestinal:  Negative for abdominal pain, constipation, diarrhea, nausea and vomiting.   Endocrine: Negative for polydipsia and polyuria.   Musculoskeletal:  Negative for myalgias.   Skin:  Negative for wound.   Neurological:  Negative for dizziness and weakness.   Psychiatric/Behavioral:  Negative for sleep disturbance.          Current Outpatient Medications on File Prior to Visit   Medication Sig Dispense Refill   • Continuous Glucose Sensor (Dexcom G7 Sensor) Use 1 Device every 10 days 9 each 1   • Insulin Aspart (NovoLOG) 100 units/mL injection INJECT UP TO 70 UNITS DAILY VIA INSULIN PUMP 70 mL 1   • Insulin Infusion Pump Supplies (T:slim X2 3mL Cartridge) MISC Use As Directed. 10 each 2   • Insulin Infusion Pump Supplies (Tandem Mobi AutoSoft XC Kit) MISC CHANGE EVERY THREE (3) DAYS 90 each 1   • ketoconazole (NIZORAL) 2 % cream      • [DISCONTINUED] Continuous Glucose  (Dexcom G7 ) BART Use 1 each in the morning 1 each 0   • [DISCONTINUED] Continuous Glucose Sensor (Dexcom G7 Sensor) Use 1 Device every 10 days 1  "each 0   • cholecalciferol (VITAMIN D3) 400 units tablet Take 400 Units by mouth daily (Patient not taking: Reported on 4/10/2025)     • vitamin E, tocopherol, 200 units capsule Take 200 Units by mouth daily (Patient not taking: Reported on 7/1/2024)     • [DISCONTINUED] Continuous Blood Gluc Transmit (Dexcom G6 Transmitter) MISC Used As Directed. (Patient not taking: Reported on 4/10/2025) 3 each 2   • [DISCONTINUED] Continuous Glucose Sensor (Dexcom G6 Sensor) MISC Inject 1 Device under the skin every 10 days (Patient not taking: Reported on 4/10/2025) 3 each 2   • [DISCONTINUED] insulin NPH (NovoLIN N) 100 Units/mL subcutaneous injection INJECT UP TO 70 UNITS DAILY VIA INSULIN PUMP (Patient not taking: Reported on 4/10/2025) 70 mL 1     No current facility-administered medications on file prior to visit.      Social History     Tobacco Use   • Smoking status: Former     Current packs/day: 1.00     Average packs/day: 1 pack/day for 23.3 years (23.3 ttl pk-yrs)     Types: Cigarettes     Start date: 2002   • Smokeless tobacco: Never   Vaping Use   • Vaping status: Never Used   Substance and Sexual Activity   • Alcohol use: Never   • Drug use: Never   • Sexual activity: Not on file        Objective   /80 (BP Location: Right arm, Patient Position: Sitting, Cuff Size: Standard)   Pulse 58   Temp 97.6 °F (36.4 °C) (Temporal)   Ht 5' 7\" (1.702 m)   Wt 96.7 kg (213 lb 3.2 oz)   SpO2 97%   BMI 33.39 kg/m²      Physical Exam  Vitals reviewed.   Constitutional:       General: He is not in acute distress.     Appearance: Normal appearance. He is well-groomed. He is obese.   HENT:      Head: Normocephalic and atraumatic.      Mouth/Throat:      Mouth: Mucous membranes are moist.   Eyes:      Conjunctiva/sclera: Conjunctivae normal.   Cardiovascular:      Rate and Rhythm: Normal rate.   Pulmonary:      Effort: Pulmonary effort is normal. No respiratory distress.   Abdominal:      General: There is no distension.     "  Palpations: Abdomen is soft.   Musculoskeletal:         General: No swelling.      Cervical back: Normal range of motion.   Skin:     General: Skin is warm and dry.   Neurological:      Mental Status: He is alert and oriented to person, place, and time.   Psychiatric:         Mood and Affect: Mood normal.         Behavior: Behavior normal. Behavior is cooperative.         Thought Content: Thought content normal.         Judgment: Judgment normal.         Administrative Statements   I have spent a total time of 36 minutes in caring for this patient on the day of the visit/encounter including Diagnostic results, Prognosis, Risks and benefits of tx options, Instructions for management, Patient and family education, Importance of tx compliance, Risk factor reductions, Impressions, Counseling / Coordination of care, Documenting in the medical record, Reviewing/placing orders in the medical record (including tests, medications, and/or procedures), and Obtaining or reviewing history  .

## 2025-04-10 NOTE — ASSESSMENT & PLAN NOTE
"A1c at goal of less than 7%.    We tightened his carb ratio to 1: 7.5 and ISF to 1: 45.  He was encouraged to not override recommendations from the pump.  He was encouraged to avoid insulin stacking.  He was advised to come out of sleep mode and stay in control IQ so that he may receive the benefit of correctional boluses from the pump.    He has backup insulin in the event of pump failure.    Reviewed risks as well as signs and symptoms of hypoglycemia.  Rule of 15's provided in AVS for appropriate treatment.  Call the office for blood glucose levels less than 70 mg/dL or persistent patterns over 250 mg/dL.  Continue Dexcom G7 continuous glucose monitor.  Glucagon sent to his pharmacy in the event of an emergency.    Continue to improve diet and lifestyle including attention to the amount and type of carbohydrates consumed as well as increased physical activity as tolerated.  Encouraged to reduce carbohydrate intake.  Stay well-hydrated.    Follow-up in 4 months.  Labs prior to next visit.  Lab Results   Component Value Date    HGBA1C 6.9 (H) 03/18/2025       Orders:  •  Glucagon (Gvoke HypoPen 1-Pack) 1 MG/0.2ML SOAJ; Inject 0.2 mL under the skin if needed (Used to correct low blood sugar when unable to eat or drink) Use to correct low blood sugar when unable to eat or drink  •  insulin glargine (Toujeo SoloStar) 300 units/mL CONCENTRATED U-300 injection pen (1-unit dial); For use in the event of pump failure. Inject 20 units daily  •  Hemoglobin A1C; Future  •  Basic metabolic panel; Future  •  Ambulatory Referral to Family Practice  •  Insulin Pen Needle (BD Pen Needle Jaye U/F) 32G X 4 MM MISC; Use to administer insulin in the event of pump failure  •  Insulin Syringe-Needle U-100 30G X 1/2\" 1 ML MISC; Use to administer insulin 4x daily in the event of pump failure.  •  TSH, 3rd generation with Free T4 reflex; Future  "

## 2025-04-10 NOTE — ASSESSMENT & PLAN NOTE
LDL above goal of less than 70.  Will start statin therapy, atorvastatin 10 mg daily.  Reviewed common adverse effect of statin therapy includes myalgias and he should inform the office if he experiences same.  Orders:  •  Ambulatory Referral to Family Practice  •  atorvastatin (LIPITOR) 10 mg tablet; Take 1 tablet (10 mg total) by mouth daily

## 2025-04-10 NOTE — PATIENT INSTRUCTIONS
Call the office for blood glucose levels less than 70 mg/dL or persistent patterns over 250 mg/dL.    Low Blood Sugar  Steps to treat low blood sugar.    1. Test blood sugar if you have symptoms of low blood sugar:   Low Blood Sugar Symptoms:  o Sweaty  o Dizzy  o Rapid heartbeat  o Shaky  o Bad mood  o Hungry    2. Treat blood sugar less than 70 with 15 grams of fast-acting carbohydrate:   Examples of 15 grams Fast-Acting Carbohydrate:  o 4 oz juice/ 4 oz regular soda  o 1 tablespoon honey  o 1 pack of fun size skittles (about 15 individual skittles)  o 12 gummy bears  o 4 starburst   o 3-4 hard candies (chew)  o 3-4 glucose tablets (chew)            3.   Wait 15 minutes and test your blood sugar again         4.   If blood sugar is less than 100, repeat steps 2-3.    5.   When your blood sugar is 100 or more, eat a snack if it will be longer than one hour until your next meal. The snack should be 15 grams of carbohydrate and a protein:   Examples of snacks:  o ½ sandwich  o 6 crackers with cheese or with peanut butter  o Piece of fruit with cheese or peanut butter

## 2025-04-14 ENCOUNTER — APPOINTMENT (OUTPATIENT)
Age: 40
End: 2025-04-14
Attending: STUDENT IN AN ORGANIZED HEALTH CARE EDUCATION/TRAINING PROGRAM
Payer: COMMERCIAL

## 2025-04-14 ENCOUNTER — OFFICE VISIT (OUTPATIENT)
Age: 40
End: 2025-04-14
Payer: COMMERCIAL

## 2025-04-14 VITALS
DIASTOLIC BLOOD PRESSURE: 80 MMHG | TEMPERATURE: 97 F | WEIGHT: 214.2 LBS | BODY MASS INDEX: 33.62 KG/M2 | OXYGEN SATURATION: 97 % | HEIGHT: 67 IN | RESPIRATION RATE: 18 BRPM | SYSTOLIC BLOOD PRESSURE: 136 MMHG | HEART RATE: 66 BPM

## 2025-04-14 DIAGNOSIS — I10 HTN, GOAL BELOW 140/80: ICD-10-CM

## 2025-04-14 DIAGNOSIS — Z11.59 NEED FOR HEPATITIS C SCREENING TEST: ICD-10-CM

## 2025-04-14 DIAGNOSIS — Z00.00 ANNUAL PHYSICAL EXAM: Primary | ICD-10-CM

## 2025-04-14 DIAGNOSIS — Z11.4 SCREENING FOR HIV (HUMAN IMMUNODEFICIENCY VIRUS): ICD-10-CM

## 2025-04-14 DIAGNOSIS — Z12.83 SKIN CANCER SCREENING: ICD-10-CM

## 2025-04-14 DIAGNOSIS — E10.9 TYPE 1 DIABETES MELLITUS WITHOUT COMPLICATION (HCC): ICD-10-CM

## 2025-04-14 DIAGNOSIS — E78.5 DYSLIPIDEMIA, GOAL LDL BELOW 70: ICD-10-CM

## 2025-04-14 DIAGNOSIS — Z13.228 SCREENING FOR METABOLIC DISORDER: ICD-10-CM

## 2025-04-14 DIAGNOSIS — J30.2 SEASONAL ALLERGIES: ICD-10-CM

## 2025-04-14 DIAGNOSIS — Z23 ENCOUNTER FOR IMMUNIZATION: ICD-10-CM

## 2025-04-14 PROCEDURE — 99386 PREV VISIT NEW AGE 40-64: CPT | Performed by: STUDENT IN AN ORGANIZED HEALTH CARE EDUCATION/TRAINING PROGRAM

## 2025-04-14 PROCEDURE — 36415 COLL VENOUS BLD VENIPUNCTURE: CPT

## 2025-04-14 PROCEDURE — 86803 HEPATITIS C AB TEST: CPT

## 2025-04-14 PROCEDURE — 90471 IMMUNIZATION ADMIN: CPT | Performed by: STUDENT IN AN ORGANIZED HEALTH CARE EDUCATION/TRAINING PROGRAM

## 2025-04-14 PROCEDURE — 85025 COMPLETE CBC W/AUTO DIFF WBC: CPT

## 2025-04-14 PROCEDURE — 87389 HIV-1 AG W/HIV-1&-2 AB AG IA: CPT

## 2025-04-14 PROCEDURE — 90677 PCV20 VACCINE IM: CPT | Performed by: STUDENT IN AN ORGANIZED HEALTH CARE EDUCATION/TRAINING PROGRAM

## 2025-04-14 RX ORDER — LISINOPRIL 5 MG/1
5 TABLET ORAL DAILY
Qty: 30 TABLET | Refills: 2 | Status: SHIPPED | OUTPATIENT
Start: 2025-04-14

## 2025-04-14 RX ORDER — ALBUTEROL SULFATE 90 UG/1
2 INHALANT RESPIRATORY (INHALATION) EVERY 6 HOURS PRN
Qty: 8.5 G | Refills: 5 | Status: SHIPPED | OUTPATIENT
Start: 2025-04-14

## 2025-04-14 NOTE — PATIENT INSTRUCTIONS
"Patient Education     Routine physical for adults   The Basics   Written by the doctors and editors at [facility]   What is a physical? -- A physical is a routine visit, or \"check-up,\" with your doctor. You might also hear it called a \"wellness visit\" or \"preventive visit.\"  During each visit, the doctor will:   Ask about your physical and mental health   Ask about your habits, behaviors, and lifestyle   Do an exam   Give you vaccines if needed   Talk to you about any medicines you take   Give advice about your health   Answer your questions  Getting regular check-ups is an important part of taking care of your health. It can help your doctor find and treat any problems you have. But it's also important for preventing health problems.  A routine physical is different from a \"sick visit.\" A sick visit is when you see a doctor because of a health concern or problem. Since physicals are scheduled ahead of time, you can think about what you want to ask the doctor.  How often should I get a physical? -- It depends on your age and health. In general, for people age 21 years and older:   If you are younger than 50 years, you might be able to get a physical every 3 years.   If you are 50 years or older, your doctor might recommend a physical every year.  If you have an ongoing health condition, like diabetes or high blood pressure, your doctor will probably want to see you more often.  What happens during a physical? -- In general, each visit will include:   Physical exam - The doctor or nurse will check your height, weight, heart rate, and blood pressure. They will also look at your eyes and ears. They will ask about how you are feeling and whether you have any symptoms that bother you.   Medicines - It's a good idea to bring a list of all the medicines you take to each doctor visit. Your doctor will talk to you about your medicines and answer any questions. Tell them if you are having any side effects that bother you. You " "should also tell them if you are having trouble paying for any of your medicines.   Habits and behaviors - This includes:   Your diet   Your exercise habits   Whether you smoke, drink alcohol, or use drugs   Whether you are sexually active   Whether you feel safe at home  Your doctor will talk to you about things you can do to improve your health and lower your risk of health problems. They will also offer help and support. For example, if you want to quit smoking, they can give you advice and might prescribe medicines. If you want to improve your diet or get more physical activity, they can help you with this, too.   Lab tests, if needed - The tests you get will depend on your age and situation. For example, your doctor might want to check your:   Cholesterol   Blood sugar   Iron level   Vaccines - The recommended vaccines will depend on your age, health, and what vaccines you already had. Vaccines are very important because they can prevent certain serious or deadly infections.   Discussion of screening - \"Screening\" means checking for diseases or other health problems before they cause symptoms. Your doctor can recommend screening based on your age, risk, and preferences. This might include tests to check for:   Cancer, such as breast, prostate, cervical, ovarian, colorectal, prostate, lung, or skin cancer   Sexually transmitted infections, such as chlamydia and gonorrhea   Mental health conditions like depression and anxiety  Your doctor will talk to you about the different types of screening tests. They can help you decide which screenings to have. They can also explain what the results might mean.   Answering questions - The physical is a good time to ask the doctor or nurse questions about your health. If needed, they can refer you to other doctors or specialists, too.  Adults older than 65 years often need other care, too. As you get older, your doctor will talk to you about:   How to prevent falling at " home   Hearing or vision tests   Memory testing   How to take your medicines safely   Making sure that you have the help and support you need at home  All topics are updated as new evidence becomes available and our peer review process is complete.  This topic retrieved from [employer] on: May 02, 2024.  Topic 765133 Version 1.0  Release: 32.4.3 - C32.122  © 2024 UpToDate, Inc. and/or its affiliates. All rights reserved.  Consumer Information Use and Disclaimer   Disclaimer: This generalized information is a limited summary of diagnosis, treatment, and/or medication information. It is not meant to be comprehensive and should be used as a tool to help the user understand and/or assess potential diagnostic and treatment options. It does NOT include all information about conditions, treatments, medications, side effects, or risks that may apply to a specific patient. It is not intended to be medical advice or a substitute for the medical advice, diagnosis, or treatment of a health care provider based on the health care provider's examination and assessment of a patient's specific and unique circumstances. Patients must speak with a health care provider for complete information about their health, medical questions, and treatment options, including any risks or benefits regarding use of medications. This information does not endorse any treatments or medications as safe, effective, or approved for treating a specific patient. UpToDate, Inc. and its affiliates disclaim any warranty or liability relating to this information or the use thereof.The use of this information is governed by the Terms of Use, available at https://www.wolters[employer] Bordersuwer.com/en/know/clinical-effectiveness-terms. 2024© UpToDate, Inc. and its affiliates and/or licensors. All rights reserved.  Copyright   © 2024 UpToDate, Inc. and/or its affiliates. All rights reserved.

## 2025-04-14 NOTE — ASSESSMENT & PLAN NOTE
Atorvastatin recently started by Endocrinology. LDL 6 months ago was 100. Continue atorvastatin 10 mg qd and recheck lipids in 3 months.    Orders:    Lipid Panel with Direct LDL reflex; Future

## 2025-04-14 NOTE — ASSESSMENT & PLAN NOTE
Lab Results   Component Value Date    HGBA1C 6.9 (H) 03/18/2025     Well controlled.  Continue management of insulin pump by Endocrinology  Continue atorvastatin and recheck lipids in 3 months  Start lisinopril 5 mg qd for kidney protection  Diabetic foot exam completed  Refer to Ophthalmology    Orders:    Ambulatory Referral to Ophthalmology; Future

## 2025-04-14 NOTE — ASSESSMENT & PLAN NOTE
Start lisinopril 5 mg qd given history of diabetes.    Orders:    lisinopril (ZESTRIL) 5 mg tablet; Take 1 tablet (5 mg total) by mouth daily

## 2025-04-14 NOTE — PROGRESS NOTES
Diabetic Foot Exam    Patient's shoes and socks removed.    Right Foot/Ankle   Right Foot Inspection  Skin Exam: skin normal and skin intact. No dry skin, no warmth, no callus, no erythema, no maceration, no abnormal color, no pre-ulcer, no ulcer and no callus.     Toe Exam: ROM and strength within normal limits.     Sensory   Vibration: intact  Proprioception: intact  Monofilament testing: intact    Vascular  The right DP pulse is 2+.     Left Foot/Ankle  Left Foot Inspection  Skin Exam: skin normal and skin intact. No dry skin, no warmth, no erythema, no maceration, normal color, no pre-ulcer, no ulcer and no callus.     Toe Exam: ROM and strength within normal limits.     Sensory   Vibration: intact  Proprioception: intact  Monofilament testing: intact    Vascular  The left DP pulse is 2+.     Assign Risk Category  No deformity present  No loss of protective sensation  No weak pulses  Risk: 0  Adult Annual Physical  Name: Edward Brand      : 1985      MRN: [id number]  Encounter Provider: Fidel Kincaid MD  Encounter Date: 2025   Encounter department: [facility] [place]    :  Assessment & Plan  Annual physical exam  Immunizations: PCV20 given today; reports Tdap done about 7 years ago - encouraged to bring records  Labs: CBC now; lipids in 3 months; reveiewed recent A1c, CMP; TSH ordered by Endocrinology  Screening: HIV, Hep C        Type 1 diabetes mellitus without complication (HCC)    Lab Results   Component Value Date    HGBA1C 6.9 (H) 2025     Well controlled.  Continue management of insulin pump by Endocrinology  Continue atorvastatin and recheck lipids in 3 months  Start lisinopril 5 mg qd for kidney protection  Diabetic foot exam completed  Refer to Ophthalmology    Orders:    Ambulatory Referral to Ophthalmology; Future    Dyslipidemia, goal LDL below 70  Atorvastatin recently started by Endocrinology. LDL 6 months ago was 100. Continue atorvastatin 10 mg qd  and recheck lipids in 3 months.    Orders:    Lipid Panel with Direct LDL reflex; Future    HTN, goal below 140/80  Start lisinopril 5 mg qd given history of diabetes.    Orders:    lisinopril (ZESTRIL) 5 mg tablet; Take 1 tablet (5 mg total) by mouth daily    Seasonal allergies    Orders:    albuterol (ProAir HFA) 90 mcg/act inhaler; Inhale 2 puffs every 6 (six) hours as needed for wheezing    Skin cancer screening    Orders:    Ambulatory Referral to Dermatology; Future    Screening for metabolic disorder    Orders:    CBC and differential; Future    Need for hepatitis C screening test    Orders:    Hepatitis C antibody; Future    Screening for HIV (human immunodeficiency virus)    Orders:    HIV 1/2 AG/AB w Reflex [facility] for 2 yr old and above; Future    Encounter for immunization    Orders:    Pneumococcal Conjugate Vaccine 20-valent (Pcv20)          Preventive Screenings:  - Diabetes Screening: screening not indicated and has diabetes  - Cholesterol Screening: screening not indicated and has hyperlipidemia   - Hepatitis C screening: orders placed   - HIV screening: orders placed   - Colon cancer screening: screening not indicated   - Lung cancer screening: screening not indicated   - Prostate cancer screening: screening not indicated     Immunizations:  - Immunizations due: Influenza, Prevnar 20 and Tdap  - Immunizations given per orders      Counseling/Anticipatory Guidance:    - Dental health: discussed importance of regular tooth brushing, flossing, and dental visits.   - Diet: discussed recommendations for a healthy/well-balanced diet.   - Exercise: the importance of regular exercise/physical activity was discussed. Recommend exercise 3-5 times per week for at least 30 minutes.       Depression Screening and Follow-up Plan: Patient was screened for depression during today's encounter. They screened negative with a PHQ-2 score of 0.          History of Present Illness     Adult Annual Physical:  Patient  presents for annual physical. Edward Brand is a 38 yo M with PMH of T1DM and HLD who presents today for annual physical. He follows with Endocrinology, with well controlled diabetes..     Diet and Physical Activity:  - Diet/Nutrition: no special diet.  - Exercise: no formal exercise.    Depression Screening:  - PHQ-2 Score: 0    General Health:  - Sleep: sleeps well.  - Hearing: normal hearing bilateral ears.  - Vision: most recent eye exam > 1 year ago.  - Dental: no dental visits for > 1 year and brushes teeth twice daily.     Health:  - History of STDs: no.   - Urinary symptoms: none.     Advanced Care Planning:  - Has an advanced directive?: no    - Has a durable medical POA?: no    - ACP document given to patient?: no      Review of Systems   Constitutional:  Negative for appetite change, chills and fever.   HENT:  Negative for congestion and sore throat.    Eyes:  Negative for visual disturbance.   Respiratory:  Negative for cough and shortness of breath.    Cardiovascular:  Negative for chest pain and leg swelling.   Gastrointestinal:  Negative for abdominal pain, constipation, diarrhea and nausea.   Genitourinary:  Negative for difficulty urinating and dysuria.   Musculoskeletal:  Negative for arthralgias and myalgias.   Skin:  Negative for rash.   Neurological:  Negative for dizziness, light-headedness and headaches.   Psychiatric/Behavioral:  Negative for confusion.      Medical History Reviewed by provider this encounter:  Meds  Problems     .  Past Medical History   Past Medical History:   Diagnosis Date    Allergic     Diabetes mellitus (HCC)      History reviewed. No pertinent surgical history.  Family History   Problem Relation Age of Onset    Diabetes Maternal Aunt     Diabetes type I Maternal Aunt     Diabetes type I Family         Niece      reports that he has quit smoking. His smoking use included cigarettes. He started smoking about 23 years ago. He has a 23.3 pack-year smoking history.  "He has never used smokeless tobacco. He reports that he does not currently use alcohol. He reports that he does not currently use drugs.  Current Outpatient Medications   Medication Instructions    albuterol (ProAir HFA) 90 mcg/act inhaler 2 puffs, Inhalation, Every 6 hours PRN    atorvastatin (LIPITOR) 10 mg, Oral, Daily    Continuous Glucose Sensor (Dexcom G7 Sensor) 1 Device, Does not apply, Every 10 days    Glucagon (Gvoke HypoPen 1-Pack) 1 MG/0.2ML SOAJ 0.2 mL, Subcutaneous, As needed, Use to correct low blood sugar when unable to eat or drink    Insulin Aspart (NovoLOG) 100 units/mL injection INJECT UP TO 70 UNITS DAILY VIA INSULIN PUMP    insulin glargine (Toujeo SoloStar) 300 units/mL CONCENTRATED U-300 injection pen (1-unit dial) For use in the event of pump failure. Inject 20 units daily    Insulin Infusion Pump Supplies (T:slim X2 3mL Cartridge) MISC Use As Directed.    Insulin Infusion Pump Supplies (Tandem Mobi AutoSoft XC Kit) MISC CHANGE EVERY THREE (3) DAYS    Insulin Pen Needle (BD Pen Needle Jaye U/F) 32G X 4 MM MISC Use to administer insulin in the event of pump failure    Insulin Syringe-Needle U-100 30G X 1/2\" 1 ML MISC Use to administer insulin 4x daily in the event of pump failure.    ketoconazole (NIZORAL) 2 % cream     lisinopril (ZESTRIL) 5 mg, Oral, Daily     Allergies   Allergen Reactions    Cat Dander Allergic Rhinitis    Dog Epithelium Allergic Rhinitis      Current Outpatient Medications on File Prior to Visit   Medication Sig Dispense Refill    atorvastatin (LIPITOR) 10 mg tablet Take 1 tablet (10 mg total) by mouth daily 90 tablet 1    Continuous Glucose Sensor (Dexcom G7 Sensor) Use 1 Device every 10 days 9 each 1    Glucagon (Gvoke HypoPen 1-Pack) 1 MG/0.2ML SOAJ Inject 0.2 mL under the skin if needed (Used to correct low blood sugar when unable to eat or drink) Use to correct low blood sugar when unable to eat or drink 0.2 mL 1    Insulin Aspart (NovoLOG) 100 units/mL injection " "INJECT UP TO 70 UNITS DAILY VIA INSULIN PUMP 70 mL 1    insulin glargine (Toujeo SoloStar) 300 units/mL CONCENTRATED U-300 injection pen (1-unit dial) For use in the event of pump failure. Inject 20 units daily 1.5 mL 1    Insulin Infusion Pump Supplies (T:slim X2 3mL Cartridge) MISC Use As Directed. 10 each 2    Insulin Infusion Pump Supplies (Tandem Mobi AutoSoft XC Kit) MISC CHANGE EVERY THREE (3) DAYS 90 each 1    Insulin Pen Needle (BD Pen Needle Jaye U/F) 32G X 4 MM MISC Use to administer insulin in the event of pump failure 100 each 1    Insulin Syringe-Needle U-100 30G X 1/2\" 1 ML MISC Use to administer insulin 4x daily in the event of pump failure. 100 each 3    ketoconazole (NIZORAL) 2 % cream       [DISCONTINUED] cholecalciferol (VITAMIN D3) 400 units tablet Take 400 Units by mouth daily (Patient not taking: Reported on 11/8/2024)      [DISCONTINUED] vitamin E, tocopherol, 200 units capsule Take 200 Units by mouth daily (Patient not taking: Reported on 7/1/2024)       No current facility-administered medications on file prior to visit.      Social History     Tobacco Use    Smoking status: Former     Current packs/day: 1.00     Average packs/day: 1 pack/day for 23.3 years (23.3 ttl pk-yrs)     Types: Cigarettes     Start date: 2002    Smokeless tobacco: Never   Vaping Use    Vaping status: Never Used   Substance and Sexual Activity    Alcohol use: Not Currently    Drug use: Not Currently    Sexual activity: Yes     Partners: Female     Birth control/protection: Coitus interruptus, Condom Male       Objective   /80 (BP Location: Left arm, Patient Position: Sitting, Cuff Size: Standard)   Pulse 66   Temp (!) 97 °F (36.1 °C) (Tympanic)   Resp 18   Ht 5' 7\" (1.702 m)   Wt 97.2 kg (214 lb 3.2 oz)   SpO2 97%   BMI 33.55 kg/m²     Physical Exam  Constitutional:       General: He is not in acute distress.  HENT:      Right Ear: Tympanic membrane, ear canal and external ear normal.      Left Ear: " Tympanic membrane, ear canal and external ear normal.      Nose: Nose normal.      Mouth/Throat:      Mouth: Mucous membranes are moist.      Pharynx: Oropharynx is clear.   Eyes:      Conjunctiva/sclera: Conjunctivae normal.      Pupils: Pupils are equal, round, and reactive to light.   Neck:      Thyroid: No thyroid mass or thyroid tenderness.   Cardiovascular:      Rate and Rhythm: Normal rate and regular rhythm.      Pulses: no weak pulses.           Dorsalis pedis pulses are 2+ on the right side and 2+ on the left side.      Heart sounds: Normal heart sounds.   Pulmonary:      Effort: Pulmonary effort is normal.      Breath sounds: Normal breath sounds.   Abdominal:      General: There is no distension.      Tenderness: There is no abdominal tenderness.   Musculoskeletal:      Cervical back: Neck supple.      Right lower leg: No edema.      Left lower leg: No edema.   Feet:      Right foot:      Skin integrity: No ulcer, skin breakdown, erythema, warmth, callus or dry skin.      Left foot:      Skin integrity: No ulcer, skin breakdown, erythema, warmth, callus or dry skin.   Skin:     General: Skin is warm and dry.      Comments: Insulin pump RLQ  Multiple seborrheic keratoses on trunk   Neurological:      Mental Status: He is alert.      Sensory: Sensation is intact.      Motor: Motor function is intact.   Psychiatric:         Mood and Affect: Mood normal.         Speech: Speech normal.         Behavior: Behavior normal. Behavior is cooperative.

## 2025-04-15 ENCOUNTER — RESULTS FOLLOW-UP (OUTPATIENT)
Age: 40
End: 2025-04-15

## 2025-04-15 LAB
BASOPHILS # BLD AUTO: 0.11 THOUSANDS/ÂΜL (ref 0–0.1)
BASOPHILS NFR BLD AUTO: 1 % (ref 0–1)
EOSINOPHIL # BLD AUTO: 0.51 THOUSAND/ÂΜL (ref 0–0.61)
EOSINOPHIL NFR BLD AUTO: 6 % (ref 0–6)
ERYTHROCYTE [DISTWIDTH] IN BLOOD BY AUTOMATED COUNT: 13.2 % (ref 11.6–15.1)
HCT VFR BLD AUTO: 48.8 % (ref 36.5–49.3)
HCV AB SER QL: NORMAL
HGB BLD-MCNC: 15.5 G/DL (ref 12–17)
HIV 1+2 AB+HIV1 P24 AG SERPL QL IA: NORMAL
IMM GRANULOCYTES # BLD AUTO: 0.02 THOUSAND/UL (ref 0–0.2)
IMM GRANULOCYTES NFR BLD AUTO: 0 % (ref 0–2)
LYMPHOCYTES # BLD AUTO: 2.4 THOUSANDS/ÂΜL (ref 0.6–4.47)
LYMPHOCYTES NFR BLD AUTO: 28 % (ref 14–44)
MCH RBC QN AUTO: 27.6 PG (ref 26.8–34.3)
MCHC RBC AUTO-ENTMCNC: 31.8 G/DL (ref 31.4–37.4)
MCV RBC AUTO: 87 FL (ref 82–98)
MONOCYTES # BLD AUTO: 0.57 THOUSAND/ÂΜL (ref 0.17–1.22)
MONOCYTES NFR BLD AUTO: 7 % (ref 4–12)
NEUTROPHILS # BLD AUTO: 4.85 THOUSANDS/ÂΜL (ref 1.85–7.62)
NEUTS SEG NFR BLD AUTO: 58 % (ref 43–75)
NRBC BLD AUTO-RTO: 0 /100 WBCS
PLATELET # BLD AUTO: 372 THOUSANDS/UL (ref 149–390)
PMV BLD AUTO: 11.1 FL (ref 8.9–12.7)
RBC # BLD AUTO: 5.61 MILLION/UL (ref 3.88–5.62)
WBC # BLD AUTO: 8.46 THOUSAND/UL (ref 4.31–10.16)

## 2025-04-16 LAB
LEFT EYE DIABETIC RETINOPATHY: NORMAL
RIGHT EYE DIABETIC RETINOPATHY: NORMAL

## 2025-07-15 ENCOUNTER — APPOINTMENT (OUTPATIENT)
Age: 40
End: 2025-07-15
Payer: COMMERCIAL

## 2025-07-15 DIAGNOSIS — E78.5 DYSLIPIDEMIA, GOAL LDL BELOW 70: ICD-10-CM

## 2025-07-15 DIAGNOSIS — E10.9 TYPE 1 DIABETES MELLITUS WITHOUT COMPLICATION (HCC): ICD-10-CM

## 2025-07-15 LAB
ANION GAP SERPL CALCULATED.3IONS-SCNC: 10 MMOL/L (ref 4–13)
BUN SERPL-MCNC: 10 MG/DL (ref 5–25)
CALCIUM SERPL-MCNC: 9.3 MG/DL (ref 8.4–10.2)
CHLORIDE SERPL-SCNC: 102 MMOL/L (ref 96–108)
CHOLEST SERPL-MCNC: 132 MG/DL (ref ?–200)
CO2 SERPL-SCNC: 28 MMOL/L (ref 21–32)
CREAT SERPL-MCNC: 0.94 MG/DL (ref 0.6–1.3)
GFR SERPL CREATININE-BSD FRML MDRD: 101 ML/MIN/1.73SQ M
GLUCOSE SERPL-MCNC: 185 MG/DL (ref 65–140)
HDLC SERPL-MCNC: 47 MG/DL
LDLC SERPL CALC-MCNC: 63 MG/DL (ref 0–100)
POTASSIUM SERPL-SCNC: 4 MMOL/L (ref 3.5–5.3)
SODIUM SERPL-SCNC: 140 MMOL/L (ref 135–147)
TRIGL SERPL-MCNC: 108 MG/DL (ref ?–150)
TSH SERPL DL<=0.05 MIU/L-ACNC: 1.07 UIU/ML (ref 0.45–4.5)

## 2025-07-15 PROCEDURE — 36415 COLL VENOUS BLD VENIPUNCTURE: CPT

## 2025-07-15 PROCEDURE — 80061 LIPID PANEL: CPT

## 2025-07-15 PROCEDURE — 83036 HEMOGLOBIN GLYCOSYLATED A1C: CPT

## 2025-07-15 PROCEDURE — 80048 BASIC METABOLIC PNL TOTAL CA: CPT

## 2025-07-15 PROCEDURE — 84443 ASSAY THYROID STIM HORMONE: CPT

## 2025-07-16 LAB
EST. AVERAGE GLUCOSE BLD GHB EST-MCNC: 148 MG/DL
HBA1C MFR BLD: 6.8 %

## 2025-07-17 ENCOUNTER — OFFICE VISIT (OUTPATIENT)
Age: 40
End: 2025-07-17
Payer: COMMERCIAL

## 2025-07-17 ENCOUNTER — TELEPHONE (OUTPATIENT)
Dept: ADMINISTRATIVE | Facility: OTHER | Age: 40
End: 2025-07-17

## 2025-07-17 VITALS
BODY MASS INDEX: 32.71 KG/M2 | HEART RATE: 60 BPM | OXYGEN SATURATION: 98 % | TEMPERATURE: 95.4 F | HEIGHT: 67 IN | WEIGHT: 208.4 LBS | DIASTOLIC BLOOD PRESSURE: 72 MMHG | RESPIRATION RATE: 12 BRPM | SYSTOLIC BLOOD PRESSURE: 114 MMHG

## 2025-07-17 DIAGNOSIS — E10.9 TYPE 1 DIABETES MELLITUS WITHOUT COMPLICATION (HCC): Primary | ICD-10-CM

## 2025-07-17 DIAGNOSIS — E78.5 DYSLIPIDEMIA, GOAL LDL BELOW 70: ICD-10-CM

## 2025-07-17 PROCEDURE — 99214 OFFICE O/P EST MOD 30 MIN: CPT | Performed by: STUDENT IN AN ORGANIZED HEALTH CARE EDUCATION/TRAINING PROGRAM

## 2025-07-17 NOTE — TELEPHONE ENCOUNTER
Upon review of the In Basket request and the patient's chart, initial outreach has been made via fax to facility. Please see Contacts section for details.     Thank you  Tito Avila MA

## 2025-07-17 NOTE — ASSESSMENT & PLAN NOTE
Lab Results   Component Value Date    HGBA1C 6.8 (H) 07/15/2025     Well controlled.   Continue management of pump as per Endocrinology  Continue statin, lisinopril at current doses  Care gap request placed for diabetic eye exam done in April at [facility]    Orders:    Albumin / creatinine urine ratio; Future

## 2025-07-17 NOTE — PROGRESS NOTES
"Name: Edward Brand      : 1985      MRN: [id number]  Encounter Provider: Fidel Kincaid MD  Encounter Date: 2025   Encounter department: [facility]  :  Assessment & Plan  Type 1 diabetes mellitus without complication (HCC)    Lab Results   Component Value Date    HGBA1C 6.8 (H) 07/15/2025     Well controlled.   Continue management of pump as per Endocrinology  Continue statin, lisinopril at current doses  Care gap request placed for diabetic eye exam done in April at [facility]    Orders:    Albumin / creatinine urine ratio; Future    Dyslipidemia, goal LDL below 70  Much better controlled after starting atorvastatin. At goal at dose of only 10 mg qd. Continue current dose.              History of Present Illness   Edward Brand is a 40 yo M with PMH of T1DM and HLD who presents today for follow up. No complaints.      Review of Systems   Respiratory:  Negative for shortness of breath.    Cardiovascular:  Negative for chest pain.   Gastrointestinal:  Negative for abdominal pain.   Neurological:  Negative for dizziness, light-headedness and headaches.       Objective   /72 (BP Location: Right arm, Patient Position: Sitting)   Pulse 60   Temp (!) 95.4 °F (35.2 °C) (Tympanic)   Resp 12   Ht 5' 7\" (1.702 m)   Wt 94.5 kg (208 lb 6.4 oz)   SpO2 98%   BMI 32.64 kg/m²      Physical Exam  Constitutional:       General: He is not in acute distress.    Eyes:      Conjunctiva/sclera: Conjunctivae normal.       Cardiovascular:      Rate and Rhythm: Normal rate and regular rhythm.      Heart sounds: Normal heart sounds.   Pulmonary:      Effort: Pulmonary effort is normal.      Breath sounds: Normal breath sounds.     Musculoskeletal:      Right lower leg: No edema.      Left lower leg: No edema.     Skin:     General: Skin is warm and dry.     Neurological:      Mental Status: He is alert.     Psychiatric:         Speech: Speech normal.         Behavior: " Behavior normal. Behavior is cooperative.

## 2025-07-17 NOTE — TELEPHONE ENCOUNTER
----- Message from Ashley ERAZO sent at 7/17/2025 10:38 AM EDT -----  Regarding: Diabetic eye exam-update Health Maintenance  07/17/25 10:39 AM    Hello, our patient Edward Brand has had Diabetic Eye Exam completed/performed. Please assist in updating the patient chart by making an External outreach to [facility] facility located in [place], [place]. The date of service is 4/16/25.    Thank you,  Ashley Yu LPN   PRIMARY CARE [place]

## 2025-07-17 NOTE — ASSESSMENT & PLAN NOTE
Much better controlled after starting atorvastatin. At goal at dose of only 10 mg qd. Continue current dose.

## 2025-07-18 NOTE — TELEPHONE ENCOUNTER
Upon review of the In Basket request we were able to locate, review, and update the patient chart as requested for Diabetic Eye Exam.    Any additional questions or concerns should be emailed to the Practice Liaisons via the appropriate education email address, please do not reply via In Basket.    Thank you  Tito Avila MA   [facility]

## 2025-07-30 DIAGNOSIS — I10 HTN, GOAL BELOW 140/80: ICD-10-CM

## 2025-07-30 RX ORDER — LISINOPRIL 5 MG/1
5 TABLET ORAL DAILY
Qty: 30 TABLET | Refills: 2 | Status: SHIPPED | OUTPATIENT
Start: 2025-07-30